# Patient Record
Sex: FEMALE | Race: WHITE | NOT HISPANIC OR LATINO | Employment: FULL TIME | URBAN - METROPOLITAN AREA
[De-identification: names, ages, dates, MRNs, and addresses within clinical notes are randomized per-mention and may not be internally consistent; named-entity substitution may affect disease eponyms.]

---

## 2020-01-13 ENCOUNTER — OFFICE VISIT (OUTPATIENT)
Dept: URGENT CARE | Facility: CLINIC | Age: 35
End: 2020-01-13
Payer: COMMERCIAL

## 2020-01-13 VITALS
TEMPERATURE: 99 F | OXYGEN SATURATION: 100 % | DIASTOLIC BLOOD PRESSURE: 56 MMHG | HEART RATE: 89 BPM | BODY MASS INDEX: 27.72 KG/M2 | WEIGHT: 141.2 LBS | SYSTOLIC BLOOD PRESSURE: 100 MMHG | HEIGHT: 60 IN | RESPIRATION RATE: 18 BRPM

## 2020-01-13 DIAGNOSIS — A08.4 VIRAL GASTROENTERITIS: Primary | ICD-10-CM

## 2020-01-13 PROCEDURE — 99203 OFFICE O/P NEW LOW 30 MIN: CPT | Performed by: PHYSICIAN ASSISTANT

## 2020-01-13 RX ORDER — ONDANSETRON 4 MG/1
4 TABLET, FILM COATED ORAL EVERY 8 HOURS PRN
Qty: 20 TABLET | Refills: 0 | Status: SHIPPED | OUTPATIENT
Start: 2020-01-13

## 2020-01-13 NOTE — PATIENT INSTRUCTIONS
Gastroenteritis   WHAT YOU NEED TO KNOW:   Gastroenteritis, or stomach flu, is an infection of the stomach and intestines  DISCHARGE INSTRUCTIONS:   Call 911 for any of the following:   · You have trouble breathing or a very fast pulse  Return to the emergency department if:   · You see blood in your diarrhea  · You cannot stop vomiting  · You have not urinated for 12 hours  · You feel like you are going to faint  Contact your healthcare provider if:   · You have a fever  · You continue to vomit or have diarrhea, even after treatment  · You see worms in your diarrhea  · Your mouth or eyes are dry  You are not urinating as much or as often  · You have questions or concerns about your condition or care  Medicines:   · Medicines  may be given to stop vomiting or diarrhea, decrease abdominal cramps, or treat an infection  · Take your medicine as directed  Contact your healthcare provider if you think your medicine is not helping or if you have side effects  Tell him or her if you are allergic to any medicine  Keep a list of the medicines, vitamins, and herbs you take  Include the amounts, and when and why you take them  Bring the list or the pill bottles to follow-up visits  Carry your medicine list with you in case of an emergency  Manage your symptoms:   · Drink liquids as directed  Ask your healthcare provider how much liquid to drink each day, and which liquids are best for you  You may also need to drink an oral rehydration solution (ORS)  An ORS has the right amounts of sugar, salt, and minerals in water to replace body fluids  · Eat bland foods  When you feel hungry, begin eating soft, bland foods  Examples are bananas, clear soup, potatoes, and applesauce  Do not have dairy products, alcohol, sugary drinks, or drinks with caffeine until you feel better  · Rest as much as possible  Slowly start to do more each day when you begin to feel better    Prevent the spread of gastroenteritis:  Gastroenteritis can spread easily  Keep yourself, your family, and your surroundings clean to help prevent the spread of gastroenteritis:  · Wash your hands often  Use soap and water  Wash your hands after you use the bathroom, change a child's diapers, or sneeze  Wash your hands before you prepare or eat food  · Clean surfaces and do laundry often  Wash your clothes and towels separately from the rest of the laundry  Clean surfaces in your home with antibacterial  or bleach  · Clean food thoroughly and cook safely  Wash raw vegetables before you cook  Cook meat, fish, and eggs fully  Do not use the same dishes for raw meat as you do for other foods  Refrigerate any leftover food immediately  · Be aware when you camp or travel  Drink only clean water  Do not drink from rivers or lakes unless you purify or boil the water first  When you travel, drink bottled water and do not add ice  Do not eat fruit that has not been peeled  Do not eat raw fish or meat that is not fully cooked  Follow up with your healthcare provider as directed:  Write down your questions so you remember to ask them during your visits  © 2017 [employer]  Information is for End User's use only and may not be sold, redistributed or otherwise used for commercial purposes  All illustrations and images included in CareNotes® are the copyrighted property of [employer]  or Blayne [employer]  The above information is an  only  It is not intended as medical advice for individual conditions or treatments  Talk to your doctor, nurse or pharmacist before following any medical regimen to see if it is safe and effective for you  Viral gastroenteritis:   -The patients vital signs and physical exam are reassuring  We discussed that this is likely a viral illness and that she should continue to improve   We discussed that she should stay well hydrated and rest  Her zofran was refilled for the nausea  We discussed progressing her diet slowly  If she begins to experience worsening symptoms like vomiting, abdominal pain, diarrhea, fever she should go to the ED for evaluation

## 2021-04-13 DIAGNOSIS — Z23 ENCOUNTER FOR IMMUNIZATION: ICD-10-CM

## 2021-06-21 NOTE — PROGRESS NOTES
[facility] [facility]        NAME: Taylor Ortiz is a 29 y o  female  : 1985    MRN: [id number]  DATE: 2020  TIME: 9:25 AM    Assessment and Plan   Viral gastroenteritis [A08 4]  1  Viral gastroenteritis  ondansetron (ZOFRAN) 4 mg tablet         Patient Instructions   Viral gastroenteritis:   -The patients vital signs and physical exam are reassuring  We discussed that this is likely a viral illness and that she should continue to improve  We discussed that she should stay well hydrated and rest  Her zofran was refilled for the nausea  We discussed progressing her diet slowly  If she begins to experience worsening symptoms like vomiting, abdominal pain, diarrhea, fever she should go to the ED for evaluation  Follow up with PCP in 3-5 days  Proceed to  ER if symptoms worsen  Chief Complaint     Chief Complaint   Patient presents with    Cold Like Symptoms     Pt here ill x  3 days pt states   nausea,  chills, fever, headache, stiff neck,  bodyaches  No flu shot  Pt used  Zofran  History of Present Illness       The patient presents today for a three day hx of nausea, chills, fever, headache, myalgias  She states that her sx began with nausea and myalgias that progressed to chills, fever and headache  Her Tmax was 100 0 degrees  Her last BM was yesterday and was well formed and brown  She states that she is no longer experiencing chills but continues to be nauseous  The patient did not have her flu vaccination  She has been taking Zofran from a prior prescription  No vomiting, abdominal pain, diarrhea, dizziness, weakness, cough, pharyngitis, congestion  She denies melena or hematochezia  She denies sick contacts or recent travel  Her last PO intake was three hours ago and was dry cereal and applesauce  She denies past abdominal surgeries  Review of Systems   Review of Systems   Constitutional: Positive for chills and fever   Negative for activity change, appetite change, Hospital discharge follow up call to pt, no answer. Reminder message left to make appt with Dr. Mcbride.  RN will attempt call back at another time.     diaphoresis and fatigue  HENT: Negative for congestion, ear discharge, ear pain, facial swelling, hearing loss, postnasal drip, rhinorrhea, sinus pressure, sinus pain, sneezing, sore throat, tinnitus, trouble swallowing and voice change  Respiratory: Negative for cough, chest tightness, shortness of breath, wheezing and stridor  Cardiovascular: Negative for chest pain, palpitations and leg swelling  Gastrointestinal: Positive for abdominal distention (bloated) and nausea  Negative for abdominal pain, anal bleeding, blood in stool, constipation, diarrhea and vomiting  Musculoskeletal: Negative for arthralgias, joint swelling, myalgias, neck pain and neck stiffness  Skin: Negative for rash  Allergic/Immunologic: Negative for immunocompromised state  Neurological: Positive for headaches  Negative for dizziness, weakness, light-headedness and numbness  Hematological: Negative for adenopathy  Current Medications       Current Outpatient Medications:     ondansetron (ZOFRAN) 4 mg tablet, Take 1 tablet (4 mg total) by mouth every 8 (eight) hours as needed for nausea or vomiting, Disp: 20 tablet, Rfl: 0    Current Allergies     Allergies as of 01/13/2020 - Reviewed 01/13/2020   Allergen Reaction Noted    Penicillins Hives 01/13/2020    Succinylcholine Other (See Comments) 01/13/2020            The following portions of the patient's history were reviewed and updated as appropriate: allergies, current medications, past family history, past medical history, past social history, past surgical history and problem list      Past Medical History:   Diagnosis Date    Pseudocholinesterase deficiency        Past Surgical History:   Procedure Laterality Date    KIDNEY STONE SURGERY      KNEE ARTHROSCOPY W/ MENISCAL REPAIR      left       Family History   Problem Relation Age of Onset    Hypertension Mother     No Known Problems Father          Medications have been verified          Objective   BP 100/56 (BP Location: Right arm, Patient Position: Sitting, Cuff Size: Standard)   Pulse 89   Temp 99 °F (37 2 °C) (Tympanic)   Resp 18   Ht 5' (1 524 m)   Wt 64 kg (141 lb 3 2 oz)   SpO2 100%   BMI 27 58 kg/m²        Physical Exam     Physical Exam   Constitutional: She is oriented to person, place, and time  She appears well-developed and well-nourished  No distress  HENT:   Head: Normocephalic and atraumatic  Right Ear: Hearing, tympanic membrane, external ear and ear canal normal    Left Ear: Hearing, tympanic membrane, external ear and ear canal normal    Nose: Nose normal  No mucosal edema or rhinorrhea  Right sinus exhibits no maxillary sinus tenderness and no frontal sinus tenderness  Left sinus exhibits no maxillary sinus tenderness and no frontal sinus tenderness  Mouth/Throat: Uvula is midline, oropharynx is clear and moist and mucous membranes are normal  No uvula swelling  No oropharyngeal exudate, posterior oropharyngeal edema, posterior oropharyngeal erythema or tonsillar abscesses  Neck: Normal range of motion  Neck supple  Cardiovascular: Normal rate, regular rhythm, S1 normal and S2 normal  Exam reveals no gallop, no S3, no S4, no distant heart sounds and no friction rub  No murmur heard  Pulmonary/Chest: No accessory muscle usage  No tachypnea and no bradypnea  No respiratory distress  She has no decreased breath sounds  She has no wheezes  She has no rhonchi  She has no rales  Abdominal: Soft  Normal appearance and bowel sounds are normal  She exhibits no shifting dullness, no distension, no fluid wave and no mass  There is no hepatosplenomegaly, splenomegaly or hepatomegaly  There is no tenderness  There is no rigidity, no rebound, no guarding, no CVA tenderness, no tenderness at McBurney's point and negative Zhao's sign  Lymphadenopathy:     She has no cervical adenopathy  Neurological: She is alert and oriented to person, place, and time     Skin: She is not diaphoretic  Psychiatric: She has a normal mood and affect  Her behavior is normal    Nursing note and vitals reviewed

## 2024-07-23 ENCOUNTER — HOSPITAL ENCOUNTER (OUTPATIENT)
Dept: HOSPITAL 99 - WDC | Age: 39
End: 2024-07-23
Payer: COMMERCIAL

## 2024-07-23 DIAGNOSIS — Z12.31: Primary | ICD-10-CM

## 2024-12-06 ENCOUNTER — HOSPITAL ENCOUNTER (EMERGENCY)
Dept: HOSPITAL 99 - EMR | Age: 39
Discharge: HOME | End: 2024-12-06
Payer: COMMERCIAL

## 2024-12-06 VITALS — DIASTOLIC BLOOD PRESSURE: 88 MMHG | SYSTOLIC BLOOD PRESSURE: 130 MMHG | RESPIRATION RATE: 16 BRPM

## 2024-12-06 VITALS — DIASTOLIC BLOOD PRESSURE: 90 MMHG | SYSTOLIC BLOOD PRESSURE: 132 MMHG | RESPIRATION RATE: 20 BRPM

## 2024-12-06 DIAGNOSIS — R10.9: Primary | ICD-10-CM

## 2024-12-06 DIAGNOSIS — R11.2: ICD-10-CM

## 2024-12-06 LAB
ALBUMIN SERPL-MCNC: 3.8 G/DL (ref 3.5–5)
ALP SERPL-CCNC: 57 U/L (ref 38–126)
ALT SERPL-CCNC: 25 U/L (ref 0–35)
APTT PPP: 26.8 SEC (ref 23.4–35)
AST SERPL-CCNC: 34 U/L (ref 14–36)
BUN SERPL-MCNC: 8 MG/DL (ref 7–17)
CALCIUM SERPL-MCNC: 8.9 MG/DL (ref 8.4–10.2)
CHLORIDE SERPL-SCNC: 105 MMOL/L (ref 98–107)
CO2 SERPL-SCNC: 27 MMOL/L (ref 22–30)
EGFR: > 60
ERYTHROCYTE [DISTWIDTH] IN BLOOD BY AUTOMATED COUNT: 12.9 % (ref 11.5–14.5)
GLUCOSE SERPL-MCNC: 92 MG/DL (ref 70–99)
HCT VFR BLD AUTO: 40.4 % (ref 37–47)
HGB BLD-MCNC: 13.9 G/DL (ref 12–16)
INR PPP: 0.93
LIPASE SERPL-CCNC: 52 U/L (ref 23–300)
MCHC RBC AUTO-ENTMCNC: 34.4 G/DL (ref 33–37)
MCV RBC AUTO: 86.1 FL (ref 81–99)
NRBC BLD AUTO-RTO: 0 %
PLATELET # BLD AUTO: 271 10^3/UL (ref 130–400)
POTASSIUM SERPL-SCNC: 3.8 MMOL/L (ref 3.5–5.1)
PROT SERPL-MCNC: 6.4 G/DL (ref 6.3–8.2)
PROTHROMBIN TIME: 12.9 SEC (ref 11.4–14.6)
SODIUM SERPL-SCNC: 140 MMOL/L (ref 135–145)
TROPONIN I SERPL-MCNC: < 0.012 NG/ML

## 2024-12-06 PROCEDURE — 99284 EMERGENCY DEPT VISIT MOD MDM: CPT

## 2024-12-06 RX ADMIN — SODIUM CHLORIDE 1000: 900 INJECTION, SOLUTION INTRAVENOUS at 04:53

## 2025-07-23 ENCOUNTER — EVALUATION (OUTPATIENT)
Dept: PHYSICAL THERAPY | Facility: CLINIC | Age: 40
End: 2025-07-23
Payer: COMMERCIAL

## 2025-07-23 DIAGNOSIS — S56.511A PARTIAL TEAR OF COMMON EXTENSOR TENDON OF RIGHT ELBOW: Primary | ICD-10-CM

## 2025-07-23 DIAGNOSIS — M77.11 LATERAL EPICONDYLITIS OF RIGHT ELBOW: ICD-10-CM

## 2025-07-23 DIAGNOSIS — Z47.89 ORTHOPEDIC AFTERCARE: ICD-10-CM

## 2025-07-23 PROCEDURE — 97161 PT EVAL LOW COMPLEX 20 MIN: CPT

## 2025-07-23 NOTE — LETTER
2025    Monster Harvey MD  [address] [address]    Patient: Mari Messer   YOB: 1985   Date of Visit: 2025     Encounter Diagnosis     ICD-10-CM    1. Partial tear of common extensor tendon of right elbow  S56.511A       2. Lateral epicondylitis of right elbow  M77.11       3. Orthopedic aftercare  Z47.89           Dear Dr. Monster Harvey MD:    Thank you for your recent referral of Mari Messer. Please review the attached evaluation summary from Mari's recent visit.     Please verify that you agree with the plan of care by signing the attached order.     If you have any questions or concerns, please do not hesitate to call.     I sincerely appreciate the opportunity to share in the care of one of your patients and hope to have another opportunity to work with you in the near future.       Sincerely,    Jake Kamara, PT      Referring Provider:      I certify that I have read the below Plan of Care and certify the need for these services furnished under this plan of treatment while under my care.                    Monster Harvey MD  [address] [address]  Via Fax: [phone]          PT Evaluation     Today's date: 2025  Patient name: Mari Messer  : 1985  MRN: [id number]  Referring provider: Monster Harvey MD  Dx:   Encounter Diagnosis     ICD-10-CM    1. Partial tear of common extensor tendon of right elbow  S56.511A       2. Lateral epicondylitis of right elbow  M77.11       3. Orthopedic aftercare  Z47.89           Start Time: 1145  Stop Time: 1230  Total time in clinic (min): 45 minutes    Assessment  Impairments: abnormal coordination, abnormal muscle firing, abnormal or restricted ROM, pain with function, participation limitations and activity limitations  Symptom irritability: low    Assessment details: Mari Messer presents as a 40 y.o. y/o female with a medical diagnosis of  s/p common extensor origin and RCL repair. Pt presents with limitations in ROM, strength, motor control, and skin integrity. Pt's impairments lead to activity limitations of reaching, lifting, cooking, eating, cleaning. 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 Pt educated on prognosis, POC, relevant anatomy, and HEP exercises. Pt to benefit from continued skilled PT to allow for pain free return to PLOF.   Understanding of Dx/Px/POC: excellent     Prognosis: excellent    Goals  STG to be completed in 2 to 4 weeks:   1) Pt to be I with HEP and self management strategies to allow for increased modulation of Sx.   2) Pt to achieve elbow A/PROM 0-110 deg to promote eating with dominant UE.   3) Pt to increase shoulder MMT by 1/2 grade to allow for increased performance of functional tasks.   4) Pt to be cleared for forearm strength assessment with additional goals to follow.     LTG to be completed in 4 to 8 weeks:   1) Pt to be I with HEP and self management strategies to allow for increased modulation of Sx.   2) Pt to achieve symmetrical elbow A/PROM to promote normalized performance of ADLs/IADLs with dominant UE.   3) Pt to increase all tested MMT by 1 grade to allow for increased performance of functional tasks.   4) Pt to demonstrate symmetrical handheld dynamometer strength to allow for improved  strength for functional tasks in community and at home.      Plan  Patient would benefit from: skilled physical therapy  Referral necessary: No  Planned modality interventions: biofeedback    Planned therapy interventions: IASTM, joint mobilization, abdominal trunk stabilization, kinesiology taping, massage, nerve gliding, neuromuscular re-education, activity modification, stretching, strengthening, patient/caregiver education, therapeutic exercise and functional ROM exercises    Frequency: 2x week  Duration in weeks: 8  Plan of Care beginning date:  2025  Plan of Care expiration date: 10/15/2025  Treatment plan discussed with: patient        Subjective Evaluation    History of Present Illness  Date of onset: 2023  Date of surgery: 7/10/2025  Mechanism of injury: Pt notes that lateral elbow pain began about 2 years prior. Pt previously received cortisone injections which successfully relieved pain in elbow for months at a time. Pt reports she was ultimately referred for surgery following MRI report of partial tear of common extensor origin and Radio collateral ligament. Pt reports she was cleared to remove brace per MD. Pt denies pain at rest but notes pulling/stiff feeling throughout elbow. Pt notes taking OTC pain relievers prn.           Recurrent probem    Quality of life: good    Patient Goals  Patient goals for therapy: decreased edema, increased strength, independence with ADLs/IADLs and increased motion    Pain  Current pain ratin  At best pain ratin  At worst pain ratin  Quality: pulling and squeezing  Relieving factors: medications and relaxation  Aggravating factors: lifting, overhead activity and keyboarding  Progression: improved      Diagnostic Tests  MRI studies: abnormal (Partial tear common extensor origin/ RCL)  Treatments  Previous treatment: injection treatment, chiropractic and medication  Current treatment: medication and physical therapy        Objective       AROM   Left  Right   GHJ Flex WNL WNL   GHJ ER WNL WNL   Elbow Flex 84 deg 132 deg   Elbow Ext 2 deg        -10   Pronation WNL      77 deg   Supination WNL      58 deg   Wrist flexion WNL  Limited   Wrist extension WNL  Limited            PROM   Left  Right   Elbow Ext 2 deg     -8 deg   Elbow Flex 140 deg     88 deg   Pronation WNL    Limited   Supination WNL   Limited   Wrist flexion WNL   Limited   Wrist extension WNL   Limited             MMT   Left  Right   GHJ Flex 4+/5 3+/5   GHJ IR 4+/5 4/5   GHJ ER 4/5 3+/5   GHJ Abd 4/5 3+/5   Elbow Flex NT NT   Elbow  Ext NT            NT   Pronation NT            NT   Supination NT            NT   Wrist flexion NT            NT   Wrist extension NT            NT       Edema:   Elbow circumference:  Left: 4 cm proximal to olecranon: 30 cm          4 cm proximal to olecranon: 29 cm  Right: 4 cm proximal to olecranon: 31 cm          4 cm proximal to olecranon: 30.5 cm              Special Tests:   L dynamometer (full ): 38 lbs  R dynamometer: not tested due to post-op restrictions       Precautions: No resistance training until 8/10/25; Initiate strengthening exercise in compression sleeve  HEP:  Access Code: [email or web address]  URL: [email or web address]  Date: 07/23/2025  Prepared by: Frank Briceno    Exercises  - Standing Overhead Triceps Stretch  - 1 x daily - 7 x weekly - 4 sets - 30 sec hold  - Wrist Flexor Stretch with Elbow Flexed: Progression From Elbow at Side  - 1 x daily - 7 x weekly - 4 sets - 30 sec hold     Insurance:  AMA/CMS Eval/ Re-eval Auth #/ Referral # Total units or visits Start date  Expiration date KX? Visit limitation?  PT only or  PT+OT? Co-Insurance   CMS Eval Auth: Pending  7/23/2025   10/15/2025      0                 POC Start Date POC Expiration Date Signed POC?   7/23/2025   10/15/2025    pending      Date 7/23              Visits/Units:  Used IE              Authed:  Remaining                                  7/23   Manuals          IE                                                       Neuro Re-Ed                                                                                                        Ther Ex                                                                                                                     Ther Activity                                       Gait Training                                       Modalities

## 2025-07-23 NOTE — PROGRESS NOTES
PT Evaluation     Today's date: 2025  Patient name: Mari Messer  : 1985  MRN: [id number]  Referring provider: Monster Harvey MD  Dx:   Encounter Diagnosis     ICD-10-CM    1. Partial tear of common extensor tendon of right elbow  S56.511A       2. Lateral epicondylitis of right elbow  M77.11       3. Orthopedic aftercare  Z47.89           Start Time: 1145  Stop Time: 1230  Total time in clinic (min): 45 minutes    Assessment  Impairments: abnormal coordination, abnormal muscle firing, abnormal or restricted ROM, pain with function, participation limitations and activity limitations  Symptom irritability: low    Assessment details: Mari Messer presents as a 40 y.o. y/o female with a medical diagnosis of s/p common extensor origin and RCL repair. Pt presents with limitations in ROM, strength, motor control, and skin integrity. Pt's impairments lead to activity limitations of reaching, lifting, cooking, eating, cleaning. 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 Pt educated on prognosis, POC, relevant anatomy, and HEP exercises. Pt to benefit from continued skilled PT to allow for pain free return to PLOF.   Understanding of Dx/Px/POC: excellent     Prognosis: excellent    Goals  STG to be completed in 2 to 4 weeks:   1) Pt to be I with HEP and self management strategies to allow for increased modulation of Sx.   2) Pt to achieve elbow A/PROM 0-110 deg to promote eating with dominant UE.   3) Pt to increase shoulder MMT by 1/2 grade to allow for increased performance of functional tasks.   4) Pt to be cleared for forearm strength assessment with additional goals to follow.     LTG to be completed in 4 to 8 weeks:   1) Pt to be I with HEP and self management strategies to allow for increased modulation of Sx.   2) Pt to achieve symmetrical elbow A/PROM to promote normalized performance of ADLs/IADLs with dominant UE.   3) Pt to increase all  tested MMT by 1 grade to allow for increased performance of functional tasks.   4) Pt to demonstrate symmetrical handheld dynamometer strength to allow for improved  strength for functional tasks in community and at home.      Plan  Patient would benefit from: skilled physical therapy  Referral necessary: No  Planned modality interventions: biofeedback    Planned therapy interventions: IASTM, joint mobilization, abdominal trunk stabilization, kinesiology taping, massage, nerve gliding, neuromuscular re-education, activity modification, stretching, strengthening, patient/caregiver education, therapeutic exercise and functional ROM exercises    Frequency: 2x week  Duration in weeks: 8  Plan of Care beginning date: 2025  Plan of Care expiration date: 10/15/2025  Treatment plan discussed with: patient        Subjective Evaluation    History of Present Illness  Date of onset: 2023  Date of surgery: 7/10/2025  Mechanism of injury: Pt notes that lateral elbow pain began about 2 years prior. Pt previously received cortisone injections which successfully relieved pain in elbow for months at a time. Pt reports she was ultimately referred for surgery following MRI report of partial tear of common extensor origin and Radio collateral ligament. Pt reports she was cleared to remove brace per MD. Pt denies pain at rest but notes pulling/stiff feeling throughout elbow. Pt notes taking OTC pain relievers prn.           Recurrent probem    Quality of life: good    Patient Goals  Patient goals for therapy: decreased edema, increased strength, independence with ADLs/IADLs and increased motion    Pain  Current pain ratin  At best pain ratin  At worst pain ratin  Quality: pulling and squeezing  Relieving factors: medications and relaxation  Aggravating factors: lifting, overhead activity and keyboarding  Progression: improved      Diagnostic Tests  MRI studies: abnormal (Partial tear common extensor origin/  RCL)  Treatments  Previous treatment: injection treatment, chiropractic and medication  Current treatment: medication and physical therapy        Objective       AROM   Left  Right   GHJ Flex WNL WNL   GHJ ER WNL WNL   Elbow Flex 84 deg 132 deg   Elbow Ext 2 deg        -10   Pronation WNL      77 deg   Supination WNL      58 deg   Wrist flexion WNL  Limited   Wrist extension WNL  Limited            PROM   Left  Right   Elbow Ext 2 deg     -8 deg   Elbow Flex 140 deg     88 deg   Pronation WNL    Limited   Supination WNL   Limited   Wrist flexion WNL   Limited   Wrist extension WNL   Limited             MMT   Left  Right   GHJ Flex 4+/5 3+/5   GHJ IR 4+/5 4/5   GHJ ER 4/5 3+/5   GHJ Abd 4/5 3+/5   Elbow Flex NT NT   Elbow Ext NT            NT   Pronation NT            NT   Supination NT            NT   Wrist flexion NT            NT   Wrist extension NT            NT       Edema:   Elbow circumference:  Left: 4 cm proximal to olecranon: 30 cm          4 cm proximal to olecranon: 29 cm  Right: 4 cm proximal to olecranon: 31 cm          4 cm proximal to olecranon: 30.5 cm              Special Tests:   L dynamometer (full ): 38 lbs  R dynamometer: not tested due to post-op restrictions       Precautions: No resistance training until 8/10/25; Initiate strengthening exercise in compression sleeve  HEP:  Access Code: [email or web address]  URL: [email or web address]  Date: 07/23/2025  Prepared by: Frank Briceno    Exercises  - Standing Overhead Triceps Stretch  - 1 x daily - 7 x weekly - 4 sets - 30 sec hold  - Wrist Flexor Stretch with Elbow Flexed: Progression From Elbow at Side  - 1 x daily - 7 x weekly - 4 sets - 30 sec hold     Insurance:  AMA/CMS Eval/ Re-eval Auth #/ Referral # Total units or visits Start date  Expiration date KX? Visit limitation?  PT only or  PT+OT? Co-Insurance   CMS Eval Auth: Pending  7/23/2025   10/15/2025      0                 POC Start Date POC Expiration Date Signed POC?   7/23/2025    10/15/2025    pending      Date 7/23              Visits/Units:  Used IE              Authed:  Remaining                                  7/23   Manuals          IE                                                       Neuro Re-Ed                                                                                                        Ther Ex                                                                                                                     Ther Activity                                       Gait Training                                       Modalities

## 2025-07-24 ENCOUNTER — OFFICE VISIT (OUTPATIENT)
Dept: PHYSICAL THERAPY | Facility: CLINIC | Age: 40
End: 2025-07-24
Payer: COMMERCIAL

## 2025-07-24 DIAGNOSIS — S56.511A PARTIAL TEAR OF COMMON EXTENSOR TENDON OF RIGHT ELBOW: Primary | ICD-10-CM

## 2025-07-24 DIAGNOSIS — M77.11 LATERAL EPICONDYLITIS OF RIGHT ELBOW: ICD-10-CM

## 2025-07-24 DIAGNOSIS — Z47.89 ORTHOPEDIC AFTERCARE: ICD-10-CM

## 2025-07-24 PROCEDURE — 97112 NEUROMUSCULAR REEDUCATION: CPT

## 2025-07-24 PROCEDURE — 97140 MANUAL THERAPY 1/> REGIONS: CPT

## 2025-07-24 PROCEDURE — 97110 THERAPEUTIC EXERCISES: CPT

## 2025-07-24 NOTE — PROGRESS NOTES
Daily Note     Today's date: 2025  Patient name: Mari Messer  : 1985  MRN: [id number]  Referring provider: Monster Harvey MD  Dx:   Encounter Diagnosis     ICD-10-CM    1. Partial tear of common extensor tendon of right elbow  S56.511A       2. Lateral epicondylitis of right elbow  M77.11       3. Orthopedic aftercare  Z47.89                      Subjective: Pt noting decrease in Sx following previous treatment. Pt noting pain as 0/10. Pt notes compliance with HEP and denies questions with them.        Objective: See treatment diary below      Assessment: Tolerated treatment well. Patient demonstrated fatigue post treatment, exhibited good technique with therapeutic exercises, and would benefit from continued PT. Pt making in session changes in ROM following STM and ROM exercises. Pt demonstrating decreased shoulder muscular endurance with resistance exercises.     Plan: Continue per plan of care.      Precautions: No resistance training until 8/10/25; Initiate strengthening exercise in compression sleeve    HEP:  Access Code: [email or web address]  URL: [email or web address]  Date: 2025  Prepared by: Frank Briceno    Exercises  - Standing Overhead Triceps Stretch  - 1 x daily - 7 x weekly - 4 sets - 30 sec hold  - Wrist Flexor Stretch with Elbow Flexed: Progression From Elbow at Side  - 1 x daily - 7 x weekly - 4 sets - 30 sec hold     Insurance:  AMA/CMS Eval/ Re-eval Auth #/ Referral # Total units or visits Start date  Expiration date KX? Visit limitation?  PT only or  PT+OT? Co-Insurance   CMS Eval Auth: Pending  2025   10/15/2025      0                 POC Start Date POC Expiration Date Signed POC?   2025   10/15/2025    pending      Date              Visits/Units:  Used IE 2             Authed:  Remaining                                    Manuals          IE   STM         Flexors and Extensors with AROM                                           Neuro Re-Ed              GHJ IR/ER walkouts         Black 10x (3steps)    GHJ Ext         Prone 2lbs 2x10                                                                     Ther Ex             Elbow extension         [id number]    Wrist Flex/Ext          [id number]    Elbow flexion stretch         2x10 hold 5s    Overhead triceps stretch         2x30s                                                        Ther Activity                                       Gait Training                                       Modalities

## 2025-07-28 ENCOUNTER — OFFICE VISIT (OUTPATIENT)
Dept: PHYSICAL THERAPY | Facility: CLINIC | Age: 40
End: 2025-07-28
Payer: COMMERCIAL

## 2025-07-28 DIAGNOSIS — Z47.89 ORTHOPEDIC AFTERCARE: ICD-10-CM

## 2025-07-28 DIAGNOSIS — M77.11 LATERAL EPICONDYLITIS OF RIGHT ELBOW: ICD-10-CM

## 2025-07-28 DIAGNOSIS — S56.511A PARTIAL TEAR OF COMMON EXTENSOR TENDON OF RIGHT ELBOW: Primary | ICD-10-CM

## 2025-07-28 PROCEDURE — 97112 NEUROMUSCULAR REEDUCATION: CPT

## 2025-07-28 PROCEDURE — 97140 MANUAL THERAPY 1/> REGIONS: CPT

## 2025-07-28 PROCEDURE — 97110 THERAPEUTIC EXERCISES: CPT

## 2025-08-01 ENCOUNTER — OFFICE VISIT (OUTPATIENT)
Dept: PHYSICAL THERAPY | Facility: CLINIC | Age: 40
End: 2025-08-01
Payer: COMMERCIAL

## 2025-08-01 DIAGNOSIS — Z47.89 ORTHOPEDIC AFTERCARE: ICD-10-CM

## 2025-08-01 DIAGNOSIS — M77.11 LATERAL EPICONDYLITIS OF RIGHT ELBOW: ICD-10-CM

## 2025-08-01 DIAGNOSIS — S56.511A PARTIAL TEAR OF COMMON EXTENSOR TENDON OF RIGHT ELBOW: Primary | ICD-10-CM

## 2025-08-01 PROCEDURE — 97112 NEUROMUSCULAR REEDUCATION: CPT

## 2025-08-01 PROCEDURE — 97140 MANUAL THERAPY 1/> REGIONS: CPT

## 2025-08-01 PROCEDURE — 97110 THERAPEUTIC EXERCISES: CPT

## 2025-08-04 ENCOUNTER — OFFICE VISIT (OUTPATIENT)
Dept: PHYSICAL THERAPY | Facility: CLINIC | Age: 40
End: 2025-08-04
Payer: COMMERCIAL

## 2025-08-04 DIAGNOSIS — M77.11 LATERAL EPICONDYLITIS OF RIGHT ELBOW: ICD-10-CM

## 2025-08-04 DIAGNOSIS — S56.511A PARTIAL TEAR OF COMMON EXTENSOR TENDON OF RIGHT ELBOW: Primary | ICD-10-CM

## 2025-08-04 DIAGNOSIS — Z47.89 ORTHOPEDIC AFTERCARE: ICD-10-CM

## 2025-08-04 PROCEDURE — 97110 THERAPEUTIC EXERCISES: CPT

## 2025-08-04 PROCEDURE — 97140 MANUAL THERAPY 1/> REGIONS: CPT

## 2025-08-07 ENCOUNTER — OFFICE VISIT (OUTPATIENT)
Dept: PHYSICAL THERAPY | Facility: CLINIC | Age: 40
End: 2025-08-07
Payer: COMMERCIAL

## 2025-08-07 DIAGNOSIS — M77.11 LATERAL EPICONDYLITIS OF RIGHT ELBOW: ICD-10-CM

## 2025-08-07 DIAGNOSIS — Z47.89 ORTHOPEDIC AFTERCARE: ICD-10-CM

## 2025-08-07 DIAGNOSIS — S56.511A PARTIAL TEAR OF COMMON EXTENSOR TENDON OF RIGHT ELBOW: Primary | ICD-10-CM

## 2025-08-07 PROCEDURE — 97110 THERAPEUTIC EXERCISES: CPT

## 2025-08-07 PROCEDURE — 97112 NEUROMUSCULAR REEDUCATION: CPT

## 2025-08-07 PROCEDURE — 97140 MANUAL THERAPY 1/> REGIONS: CPT

## 2025-08-11 ENCOUNTER — OFFICE VISIT (OUTPATIENT)
Dept: PHYSICAL THERAPY | Facility: CLINIC | Age: 40
End: 2025-08-11
Payer: COMMERCIAL

## 2025-08-15 ENCOUNTER — OFFICE VISIT (OUTPATIENT)
Dept: PHYSICAL THERAPY | Facility: CLINIC | Age: 40
End: 2025-08-15
Payer: COMMERCIAL